# Patient Record
Sex: MALE | Race: WHITE | NOT HISPANIC OR LATINO
[De-identification: names, ages, dates, MRNs, and addresses within clinical notes are randomized per-mention and may not be internally consistent; named-entity substitution may affect disease eponyms.]

---

## 2018-11-15 PROBLEM — Z00.00 ENCOUNTER FOR PREVENTIVE HEALTH EXAMINATION: Status: ACTIVE | Noted: 2018-11-15

## 2018-12-23 ENCOUNTER — RECORD ABSTRACTING (OUTPATIENT)
Age: 63
End: 2018-12-23

## 2018-12-23 DIAGNOSIS — M96.1 POSTLAMINECTOMY SYNDROME, NOT ELSEWHERE CLASSIFIED: ICD-10-CM

## 2018-12-23 DIAGNOSIS — M21.969 UNSPECIFIED ACQUIRED DEFORMITY OF UNSPECIFIED LOWER LEG: ICD-10-CM

## 2018-12-23 DIAGNOSIS — Z87.2 PERSONAL HISTORY OF DISEASES OF THE SKIN AND SUBCUTANEOUS TISSUE: ICD-10-CM

## 2018-12-23 DIAGNOSIS — Z82.0 FAMILY HISTORY OF EPILEPSY AND OTHER DISEASES OF THE NERVOUS SYSTEM: ICD-10-CM

## 2018-12-23 DIAGNOSIS — M50.90 CERVICAL DISC DISORDER, UNSPECIFIED, UNSPECIFIED CERVICAL REGION: ICD-10-CM

## 2018-12-23 DIAGNOSIS — Z87.19 PERSONAL HISTORY OF OTHER DISEASES OF THE DIGESTIVE SYSTEM: ICD-10-CM

## 2018-12-23 DIAGNOSIS — Z86.19 PERSONAL HISTORY OF OTHER INFECTIOUS AND PARASITIC DISEASES: ICD-10-CM

## 2018-12-23 DIAGNOSIS — R07.89 OTHER CHEST PAIN: ICD-10-CM

## 2018-12-23 DIAGNOSIS — R25.2 CRAMP AND SPASM: ICD-10-CM

## 2018-12-23 DIAGNOSIS — Z87.39 PERSONAL HISTORY OF OTHER DISEASES OF THE MUSCULOSKELETAL SYSTEM AND CONNECTIVE TISSUE: ICD-10-CM

## 2018-12-23 DIAGNOSIS — Z86.69 PERSONAL HISTORY OF OTHER DISEASES OF THE NERVOUS SYSTEM AND SENSE ORGANS: ICD-10-CM

## 2019-01-02 ENCOUNTER — RECORD ABSTRACTING (OUTPATIENT)
Age: 64
End: 2019-01-02

## 2019-01-02 DIAGNOSIS — Z82.49 FAMILY HISTORY OF ISCHEMIC HEART DISEASE AND OTHER DISEASES OF THE CIRCULATORY SYSTEM: ICD-10-CM

## 2019-01-03 ENCOUNTER — RX RENEWAL (OUTPATIENT)
Age: 64
End: 2019-01-03

## 2019-02-07 ENCOUNTER — RECORD ABSTRACTING (OUTPATIENT)
Age: 64
End: 2019-02-07

## 2019-02-08 ENCOUNTER — LABORATORY RESULT (OUTPATIENT)
Age: 64
End: 2019-02-08

## 2019-02-08 ENCOUNTER — NON-APPOINTMENT (OUTPATIENT)
Age: 64
End: 2019-02-08

## 2019-02-08 ENCOUNTER — APPOINTMENT (OUTPATIENT)
Dept: CARDIOLOGY | Facility: CLINIC | Age: 64
End: 2019-02-08
Payer: COMMERCIAL

## 2019-02-08 VITALS
BODY MASS INDEX: 30.35 KG/M2 | HEART RATE: 62 BPM | WEIGHT: 212 LBS | SYSTOLIC BLOOD PRESSURE: 140 MMHG | HEIGHT: 70 IN | DIASTOLIC BLOOD PRESSURE: 88 MMHG

## 2019-02-08 DIAGNOSIS — Z78.9 OTHER SPECIFIED HEALTH STATUS: ICD-10-CM

## 2019-02-08 PROCEDURE — 36415 COLL VENOUS BLD VENIPUNCTURE: CPT

## 2019-02-08 PROCEDURE — 99214 OFFICE O/P EST MOD 30 MIN: CPT

## 2019-02-08 PROCEDURE — 93000 ELECTROCARDIOGRAM COMPLETE: CPT

## 2019-02-08 NOTE — ASSESSMENT
[FreeTextEntry1] : EKG 2/9/19. Sinus rhythm. Minor interventricular conduction delay. No acute changes.

## 2019-02-08 NOTE — REASON FOR VISIT
[FreeTextEntry1] : Tyler Quintana presents for cardiovascular followup.\par \par His problem list includes hypertension, dyslipidemia, cardiac arrhythmias including paroxysmal atrial tachycardia and ventricular ectopy, minor valve abnormalities, bronchospasm.\par \par He has additional medical problems as noted.\par \par His primary care physician is Doctor Efren Lockwood.

## 2019-02-08 NOTE — HISTORY OF PRESENT ILLNESS
[FreeTextEntry1] : This 63-year-old male patient presents for cardiovascular evaluation. His problem list is as noted.\par \par Since his last examination he reports no major events or hospitalizations. He has been active and exercising. He has just joined a new law firm. He is traveling frequently. He has noted some episodic edema.\par \par No symptoms of chest discomfort, shortness of breath. He notes occasional, rare palpitation perhaps one or 2 days a month, short-lived. 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

## 2019-02-08 NOTE — PHYSICAL EXAM

## 2019-02-08 NOTE — DISCUSSION/SUMMARY
[FreeTextEntry1] : Cardiac arrhythmias including paroxysmal supraventricular tachycardia and ventricular ectopy.\par Clinically stable with rare, short-lived arrhythmia.\par Continued twice daily antiarrhythmic treatment including Rythmol SI and diltiazem used on a twice-daily basis.\par I will plan for a followup treadmill stress test to assess exercise-induced arrhythmias at his next visit. I also order an echocardiogram to assess his valve status.\par \par Hypertension.\par Generally good control. Today's elevated level is an anomaly secondary to travel. Continue current routine.\par He may take a second dose of chlorthalidone for increasing peripheral edema.\par \par Dyslipidemia.\par No recent laboratories. I taken the liberty of drawing blood tests today.\par \par Valve/echo findings\par Reassess valve abnormalities.\par \par Additional medical problems as noted particularly musculoskeletal.\par \par

## 2019-02-12 ENCOUNTER — RESULT REVIEW (OUTPATIENT)
Age: 64
End: 2019-02-12

## 2019-02-12 LAB
ALBUMIN SERPL ELPH-MCNC: 4.8 G/DL
ALP BLD-CCNC: 41 U/L
ALT SERPL-CCNC: 18 U/L
ANION GAP SERPL CALC-SCNC: 14 MMOL/L
AST SERPL-CCNC: 21 U/L
BASOPHILS # BLD AUTO: 0.08 K/UL
BASOPHILS NFR BLD AUTO: 0.9 %
BILIRUB SERPL-MCNC: 0.7 MG/DL
BUN SERPL-MCNC: 29 MG/DL
CALCIUM SERPL-MCNC: 9.7 MG/DL
CHLORIDE SERPL-SCNC: 98 MMOL/L
CHOLEST SERPL-MCNC: 184 MG/DL
CHOLEST/HDLC SERPL: 2.2 RATIO
CK SERPL-CCNC: 207 U/L
CO2 SERPL-SCNC: 22 MMOL/L
CREAT SERPL-MCNC: 1.51 MG/DL
EOSINOPHIL # BLD AUTO: 0.59 K/UL
EOSINOPHIL NFR BLD AUTO: 6.9 %
GLUCOSE SERPL-MCNC: 92 MG/DL
HCT VFR BLD CALC: 40.9 %
HDLC SERPL-MCNC: 82 MG/DL
HGB BLD-MCNC: 13.2 G/DL
IMM GRANULOCYTES NFR BLD AUTO: 0.2 %
LDLC SERPL CALC-MCNC: 89 MG/DL
LYMPHOCYTES # BLD AUTO: 4.24 K/UL
LYMPHOCYTES NFR BLD AUTO: 49.7 %
MAN DIFF?: NORMAL
MCHC RBC-ENTMCNC: 30.6 PG
MCHC RBC-ENTMCNC: 32.3 GM/DL
MCV RBC AUTO: 94.7 FL
MONOCYTES # BLD AUTO: 0.6 K/UL
MONOCYTES NFR BLD AUTO: 7 %
NEUTROPHILS # BLD AUTO: 3 K/UL
NEUTROPHILS NFR BLD AUTO: 35.3 %
PLATELET # BLD AUTO: 233 K/UL
POTASSIUM SERPL-SCNC: 4.5 MMOL/L
PROT SERPL-MCNC: 6.7 G/DL
RBC # BLD: 4.32 M/UL
RBC # FLD: 13.4 %
SODIUM SERPL-SCNC: 134 MMOL/L
TRIGL SERPL-MCNC: 66 MG/DL
WBC # FLD AUTO: 8.53 K/UL

## 2019-03-15 ENCOUNTER — RX RENEWAL (OUTPATIENT)
Age: 64
End: 2019-03-15

## 2019-04-04 ENCOUNTER — RX RENEWAL (OUTPATIENT)
Age: 64
End: 2019-04-04

## 2019-04-26 ENCOUNTER — RX RENEWAL (OUTPATIENT)
Age: 64
End: 2019-04-26

## 2019-05-09 ENCOUNTER — RX RENEWAL (OUTPATIENT)
Age: 64
End: 2019-05-09

## 2019-06-18 ENCOUNTER — RX RENEWAL (OUTPATIENT)
Age: 64
End: 2019-06-18

## 2019-08-12 ENCOUNTER — RX RENEWAL (OUTPATIENT)
Age: 64
End: 2019-08-12

## 2019-09-25 ENCOUNTER — RX RENEWAL (OUTPATIENT)
Age: 64
End: 2019-09-25

## 2019-10-21 ENCOUNTER — MED ADMIN CHARGE (OUTPATIENT)
Age: 64
End: 2019-10-21

## 2019-10-21 ENCOUNTER — NON-APPOINTMENT (OUTPATIENT)
Age: 64
End: 2019-10-21

## 2019-10-21 ENCOUNTER — APPOINTMENT (OUTPATIENT)
Dept: CARDIOLOGY | Facility: CLINIC | Age: 64
End: 2019-10-21
Payer: COMMERCIAL

## 2019-10-21 VITALS
BODY MASS INDEX: 29.92 KG/M2 | HEART RATE: 55 BPM | HEIGHT: 70 IN | DIASTOLIC BLOOD PRESSURE: 84 MMHG | SYSTOLIC BLOOD PRESSURE: 130 MMHG | WEIGHT: 209 LBS

## 2019-10-21 PROCEDURE — 99214 OFFICE O/P EST MOD 30 MIN: CPT

## 2019-10-21 PROCEDURE — G0008: CPT

## 2019-10-21 PROCEDURE — 93000 ELECTROCARDIOGRAM COMPLETE: CPT

## 2019-10-21 PROCEDURE — 90674 CCIIV4 VAC NO PRSV 0.5 ML IM: CPT

## 2019-10-21 RX ORDER — RANITIDINE HYDROCHLORIDE 150 MG/1
150 TABLET, FILM COATED ORAL
Refills: 0 | Status: DISCONTINUED | COMMUNITY
End: 2019-10-21

## 2019-10-21 NOTE — ASSESSMENT
[FreeTextEntry1] : EKG 10/21/2019.  Sinus bradycardia rate minor interventricular conduction delay.  No significant change.\par EKG 2/9/19. Sinus rhythm. Minor interventricular conduction delay. No acute changes.

## 2019-10-21 NOTE — REASON FOR VISIT
[FreeTextEntry1] : Mr. RONEL OGLESBY has the following problem list:\par \par Hypertension\par Dyslipidemia\par Cardiac arrhythmias/paroxysmal atrial tachycardia/ventricular ectopy\par Minor echo valve/abnormalities\par Bronchospasm\par \par He has additional medical problems as noted.\par \par His primary care physician is Doctor Efren Lockwood.

## 2019-10-21 NOTE — ADDENDUM
MEDICATIONS:  · See Medication List (bring to your doctor appointments)    VACCINES:  Most Recent Immunizations   Administered Date(s) Administered   • Influenza, injectable, quadrivalent, preservative-free 10/02/2017   • Influenza, seasonal, injectable, trivalent 10/06/2016   • Pneumococcal polysaccharide, adult, 23 valent 03/10/2015   • Zoster Shingles 10/19/2012       ACTIVITY:  · Weigh yourself daily (first thing in the morning, with same amount of clothes on) unless told otherwise by your doctor.  · Continue activity as you were in the hospital, slowly increase to what you were doing previously.  · Up as desired / no restrictions.    SMOKING:  · Avoid all tobacco products and secondhand smoke.  · Smoking Cessation Counseling offered.  · [place] Toll Free Quit Line: [phone]    DIET:  · Limit salt and salty foods unless told otherwise by your doctor.  · No Restrictions    · Trouble breathing or chest pain - CALL 911    CONTACT YOUR DOCTOR IF:  · You have symptoms that are not \"normal\" for you.  · You have new or worse symptoms or pain, not relieved by medicine or rest.  · Temperature greater than 101 degrees F, chills or flu like symptoms.  · You gain more than 3 pounds in 2 days.  · Increased swelling, redness or drainage.    REFERRALS (Type/Agency/Phone):  · [employer]management: [phone]     [FreeTextEntry1] : This report was generated using voice recognition software. Please excuse obvious typographical errors and contact this office for any questions. Any preliminary copy of this note given to the patient at the time of this visit has not been proofread or edited. This note is part of a shared electronic record used by our providers and may contain information generated by others in addition to those entries made during today's visit.\par

## 2019-10-21 NOTE — HISTORY OF PRESENT ILLNESS
[FreeTextEntry1] : This 64 year-old male patient presents for cardiovascular evaluation.\par \par His problem list is as noted above.\par \par Since last examination he has had a significant medical event.  Patient underwent successful cervical fusion surgery.  He has made an acceptable recovery.  He is now active again.  There were no cardiac complications.\par \par There have been no acute symptoms of shortness of breath, chest discomfort, palpitation, lightheadedness.  Overall he is feeling well.\par \par He anticipates a examination with his primary care physician and I advised him to have comprehensive blood work and to kindly forward a copy.

## 2019-10-21 NOTE — DISCUSSION/SUMMARY
[FreeTextEntry1] : Brief recommendations and follow-up: (see above for details)\par \par His overall cardiovascular status is stable.\par He is recovering well after recent cervical disc surgery.\par No cardiac complications.\par He was previously scheduled for a treadmill stress test but we will defer at this visit until his next visit in 6 months.\par He will have medical follow-up and laboratories and forward a copy for my review.\par If necessary, reevaluation with the nephrologist.\par Next office visit 6 months

## 2019-11-06 ENCOUNTER — RX RENEWAL (OUTPATIENT)
Age: 64
End: 2019-11-06

## 2019-11-18 ENCOUNTER — RX RENEWAL (OUTPATIENT)
Age: 64
End: 2019-11-18

## 2019-12-11 ENCOUNTER — RX RENEWAL (OUTPATIENT)
Age: 64
End: 2019-12-11

## 2020-02-18 ENCOUNTER — RX CHANGE (OUTPATIENT)
Age: 65
End: 2020-02-18

## 2020-02-18 RX ORDER — DILTIAZEM HYDROCHLORIDE 120 MG/1
120 CAPSULE, EXTENDED RELEASE ORAL
Qty: 120 | Refills: 4 | Status: DISCONTINUED | COMMUNITY
Start: 2019-04-04 | End: 2020-02-18

## 2020-06-29 ENCOUNTER — RX RENEWAL (OUTPATIENT)
Age: 65
End: 2020-06-29

## 2020-07-14 ENCOUNTER — APPOINTMENT (OUTPATIENT)
Dept: CARDIOLOGY | Facility: CLINIC | Age: 65
End: 2020-07-14
Payer: COMMERCIAL

## 2020-07-14 VITALS
HEIGHT: 70 IN | HEART RATE: 61 BPM | SYSTOLIC BLOOD PRESSURE: 130 MMHG | BODY MASS INDEX: 30.35 KG/M2 | DIASTOLIC BLOOD PRESSURE: 80 MMHG | WEIGHT: 212 LBS

## 2020-07-14 PROCEDURE — 99214 OFFICE O/P EST MOD 30 MIN: CPT

## 2020-07-14 PROCEDURE — 93000 ELECTROCARDIOGRAM COMPLETE: CPT

## 2020-07-14 RX ORDER — OMEPRAZOLE MAGNESIUM 20 MG/1
20 TABLET, DELAYED RELEASE ORAL DAILY
Refills: 0 | Status: ACTIVE | COMMUNITY

## 2020-07-14 RX ORDER — FAMOTIDINE 10 MG/1
TABLET, FILM COATED ORAL
Refills: 0 | Status: DISCONTINUED | COMMUNITY
End: 2020-07-14

## 2020-07-14 NOTE — ASSESSMENT
[FreeTextEntry1] : EKG 7/14/2020.  Sinus rhythm, within normal limits.\par EKG 10/21/2019.  Sinus bradycardia rate minor interventricular conduction delay.  No significant change.\par EKG 2/9/19. Sinus rhythm. Minor interventricular conduction delay. No acute changes.

## 2020-07-14 NOTE — DISCUSSION/SUMMARY
[FreeTextEntry1] : Brief recommendations and follow-up: (see above for details)\par \par His overall cardiovascular status is stable.\par We decided to defer his exercise test to the next visit when hopefully he will not be required to wear a mask.\par Laboratories will be drawn today to recheck renal function.\par He will forward copies of his most recent laboratories.\par Next office visit 6 months with treadmill stress test.

## 2020-07-14 NOTE — HISTORY OF PRESENT ILLNESS
[FreeTextEntry1] : This 64 year-old male patient presents for cardiovascular evaluation.\par \par His problem list is as noted above.\par \par Since his last examination more than 6 months ago patient reports no major events or hospitalizations.  He is active and exercising.  There are no symptoms of shortness of breath, chest discomfort, palpitation, lightheadedness, fainting.  He was scheduled for early surveillance exercise test today however in view of the COVID-19 pandemic and use of the mass, we decided to defer this to a later visit.\par \par He has seen his primary care physician and had laboratories.  He is going to make an effort to obtain for my review.\par \par He did have a "borderline" blood pressure response and his olmesartan was increased from 20 to 40 mg daily.  He is never had follow-up laboratories and we will follow this today.\par

## 2020-07-15 LAB
ALBUMIN SERPL ELPH-MCNC: 4.6 G/DL
ALP BLD-CCNC: 34 U/L
ALT SERPL-CCNC: 22 U/L
ANION GAP SERPL CALC-SCNC: 14 MMOL/L
AST SERPL-CCNC: 28 U/L
BILIRUB SERPL-MCNC: 0.6 MG/DL
BUN SERPL-MCNC: 31 MG/DL
CALCIUM SERPL-MCNC: 9.1 MG/DL
CHLORIDE SERPL-SCNC: 101 MMOL/L
CO2 SERPL-SCNC: 23 MMOL/L
CREAT SERPL-MCNC: 1.49 MG/DL
GLUCOSE SERPL-MCNC: 98 MG/DL
POTASSIUM SERPL-SCNC: 4.2 MMOL/L
PROT SERPL-MCNC: 6 G/DL
SODIUM SERPL-SCNC: 138 MMOL/L

## 2020-10-05 ENCOUNTER — RX RENEWAL (OUTPATIENT)
Age: 65
End: 2020-10-05

## 2020-11-13 ENCOUNTER — APPOINTMENT (OUTPATIENT)
Dept: CARDIOLOGY | Facility: CLINIC | Age: 65
End: 2020-11-13
Payer: COMMERCIAL

## 2020-11-13 ENCOUNTER — NON-APPOINTMENT (OUTPATIENT)
Age: 65
End: 2020-11-13

## 2020-11-13 VITALS
HEART RATE: 61 BPM | DIASTOLIC BLOOD PRESSURE: 84 MMHG | SYSTOLIC BLOOD PRESSURE: 124 MMHG | WEIGHT: 212.13 LBS | BODY MASS INDEX: 30.37 KG/M2 | HEIGHT: 70 IN

## 2020-11-13 PROCEDURE — 99213 OFFICE O/P EST LOW 20 MIN: CPT

## 2020-11-13 PROCEDURE — 0296T: CPT

## 2020-11-13 PROCEDURE — 99072 ADDL SUPL MATRL&STAF TM PHE: CPT

## 2020-11-13 PROCEDURE — 93000 ELECTROCARDIOGRAM COMPLETE: CPT | Mod: 59

## 2020-11-13 NOTE — REVIEW OF SYSTEMS
[Headache] : no headache [Feeling Fatigued] : not feeling fatigued [Shortness Of Breath] : no shortness of breath [Dyspnea on exertion] : not dyspnea during exertion [Chest Pain] : no chest pain [Lower Ext Edema] : no extremity edema [Palpitations] : palpitations [Skin: A Rash] : no rash: [Dizziness] : no dizziness [Confusion] : no confusion was observed [FreeTextEntry2] : back pain

## 2020-11-13 NOTE — REASON FOR VISIT
[Follow-Up - Clinic] : a clinic follow-up of [Palpitations] : palpitations [FreeTextEntry1] : Mr. Quintana presents for urgent visit. He called the office with report of frequent palpitations for the past few weeks. He reports recently completing two rounds of Medrol taper and receiving steroid epidural injections. He is anticipating back surgery on 12/4/2020. \par \par He denies chest pain, SOB, dizziness or syncope.\par

## 2020-11-13 NOTE — HISTORY OF PRESENT ILLNESS
[FreeTextEntry1] : 65 year old male with hypertension, hyperlipidemia, paroxysmal SVT, cervical disc disease, chronic renal insufficiency.

## 2020-11-13 NOTE — PHYSICAL EXAM
[Normal Appearance] : normal appearance [Well Groomed] : well groomed [General Appearance - In No Acute Distress] : no acute distress [Respiration, Rhythm And Depth] : normal respiratory rhythm and effort [Auscultation Breath Sounds / Voice Sounds] : lungs were clear to auscultation bilaterally [Heart Sounds] : normal S1 and S2 [Murmurs] : no murmurs present [Edema] : no peripheral edema present [Abnormal Walk] : normal gait [] : no rash [Oriented To Time, Place, And Person] : oriented to person, place, and time [Impaired Insight] : insight and judgment were intact

## 2020-11-19 ENCOUNTER — NON-APPOINTMENT (OUTPATIENT)
Age: 65
End: 2020-11-19

## 2020-11-25 ENCOUNTER — NON-APPOINTMENT (OUTPATIENT)
Age: 65
End: 2020-11-25

## 2020-11-30 ENCOUNTER — APPOINTMENT (OUTPATIENT)
Dept: CARDIOLOGY | Facility: CLINIC | Age: 65
End: 2020-11-30
Payer: MEDICARE

## 2020-11-30 VITALS
OXYGEN SATURATION: 97 % | DIASTOLIC BLOOD PRESSURE: 78 MMHG | HEART RATE: 67 BPM | WEIGHT: 213 LBS | HEIGHT: 71 IN | BODY MASS INDEX: 29.82 KG/M2 | SYSTOLIC BLOOD PRESSURE: 130 MMHG

## 2020-11-30 DIAGNOSIS — Z01.810 ENCOUNTER FOR PREPROCEDURAL CARDIOVASCULAR EXAMINATION: ICD-10-CM

## 2020-11-30 DIAGNOSIS — Z86.79 PERSONAL HISTORY OF OTHER DISEASES OF THE CIRCULATORY SYSTEM: ICD-10-CM

## 2020-11-30 PROCEDURE — 99215 OFFICE O/P EST HI 40 MIN: CPT

## 2020-11-30 PROCEDURE — 93000 ELECTROCARDIOGRAM COMPLETE: CPT | Mod: NC

## 2020-11-30 RX ORDER — METHYLPREDNISOLONE 4 MG/1
4 TABLET ORAL
Qty: 21 | Refills: 0 | Status: DISCONTINUED | COMMUNITY
Start: 2020-08-31

## 2020-11-30 NOTE — ASSESSMENT
[FreeTextEntry1] : EKG 11/30/2020.  Sinus rhythm.  T wave inversion V1 through V3, within normal limits without significant change.\par EKG 7/14/2020.  Sinus rhythm, within normal limits.\par EKG 10/21/2019.  Sinus bradycardia rate minor interventricular conduction delay.  No significant change.\par EKG 2/9/19. Sinus rhythm. Minor interventricular conduction delay. No acute changes.

## 2020-11-30 NOTE — HISTORY OF PRESENT ILLNESS
[FreeTextEntry1] : This 65 year-old male patient presents for cardiovascular evaluation.\par \par His problem list is as noted above.\par \par Patient is seen on an expedited basis.  The patient is anticipating repeat lumbar spine surgery.  He has had many months of continuing symptoms despite epidural injection, 2 courses of steroids.  Preoperative consultation is requested.\par \par Of note is that the patient was seen in our office for a sensation of palpitation and documented cardiac arrhythmia.  He has had a longstanding history of both ventricular ectopy and atrial arrhythmias that have been under good control.  He has noted however that on 2 separate occasions he has noted an exacerbation of his arrhythmia when taking steroids.  There was no apparent stimulants/epinephrine.  2-week event monitoring demonstrated periods of atrial tachycardia.  This was performed from 11/13/2020 and ending 11/18/2020.  The longest atrial run was 17 beats with an average rate of 94 bpm.  His symptoms are now resolved.\par \par There are no symptoms of shortness of breath, chest discomfort, lightheadedness, fainting.\par

## 2020-11-30 NOTE — DISCUSSION/SUMMARY
[FreeTextEntry1] : Brief recommendations and follow-up: (see above for details)\par \par The patient is anticipating lumbar spine surgery and may proceed as noted with details above.\par His atrial arrhythmias appear to be under good control.  Continue current routine.\par Continue risk factor reduction as noted.\par He will move his appointment back into March for his routine visit.

## 2020-12-16 ENCOUNTER — RX RENEWAL (OUTPATIENT)
Age: 65
End: 2020-12-16

## 2020-12-31 ENCOUNTER — NON-APPOINTMENT (OUTPATIENT)
Age: 65
End: 2020-12-31

## 2021-01-04 ENCOUNTER — APPOINTMENT (OUTPATIENT)
Dept: HEART AND VASCULAR | Facility: CLINIC | Age: 66
End: 2021-01-04
Payer: MEDICARE

## 2021-01-04 VITALS
HEART RATE: 61 BPM | SYSTOLIC BLOOD PRESSURE: 140 MMHG | HEIGHT: 71 IN | BODY MASS INDEX: 30.52 KG/M2 | DIASTOLIC BLOOD PRESSURE: 82 MMHG | WEIGHT: 218 LBS

## 2021-01-04 PROCEDURE — 99205 OFFICE O/P NEW HI 60 MIN: CPT

## 2021-01-04 PROCEDURE — 93000 ELECTROCARDIOGRAM COMPLETE: CPT

## 2021-01-05 NOTE — HISTORY OF PRESENT ILLNESS
[FreeTextEntry1] :  65 year old male with HTN, HLD, PVCs and paroxysmal atrial tachycardia, who presents for initial evaluation.\par \par He states he has had episodes of atrial tachycardia for a few years and has been on propafenone.  These episodes are getting worse and he wore a recent monitor with frequent episodes of paroxysmal atrial tachycardia.  He was also found to have some PVCs that he is not very symptomatic from.  No chest pain, syncope, near syncope, SOB.  No alleviating or aggravating factors for palpitations.  Event monitor with frequent bursts of atrial tachycardia - on propafenone and diltiazem.

## 2021-01-05 NOTE — DISCUSSION/SUMMARY
[FreeTextEntry1] :  65 year old male with HTN, HLD, PVCs and paroxysmal atrial tachycardia, who presents for initial evaluation.  We discussed the natural conduction system of the heart and what atrial tachycardia is.  He is on propafenone and diltiazem with frequent symptomatic breakthrough episodes.  We discussed options of observation, increasing medications or an EP study/ablation.  He would like to proceed with an EP study / ablation.  We discussed the procedure in detail including risks, benefits and alternatives.  He was given pre procedure instructions.  He knows to call with any questions or concerns.

## 2021-01-05 NOTE — PHYSICAL EXAM
[General Appearance - Well Developed] : well developed [Normal Appearance] : normal appearance [Well Groomed] : well groomed [General Appearance - Well Nourished] : well nourished [No Deformities] : no deformities [General Appearance - In No Acute Distress] : no acute distress [Normal Conjunctiva] : the conjunctiva exhibited no abnormalities [Normal Oral Mucosa] : normal oral mucosa [Normal Jugular Venous V Waves Present] : normal jugular venous V waves present [Heart Rate And Rhythm] : heart rate and rhythm were normal [Heart Sounds] : normal S1 and S2 [Edema] : no peripheral edema present [] : no respiratory distress [Respiration, Rhythm And Depth] : normal respiratory rhythm and effort [Exaggerated Use Of Accessory Muscles For Inspiration] : no accessory muscle use [Auscultation Breath Sounds / Voice Sounds] : lungs were clear to auscultation bilaterally [Abdomen Soft] : soft [Abnormal Walk] : normal gait [Cyanosis, Localized] : no localized cyanosis [Skin Color & Pigmentation] : normal skin color and pigmentation [Oriented To Time, Place, And Person] : oriented to person, place, and time [Impaired Insight] : insight and judgment were intact [Affect] : the affect was normal [Mood] : the mood was normal [No Anxiety] : not feeling anxious

## 2021-01-08 ENCOUNTER — INPATIENT (INPATIENT)
Facility: HOSPITAL | Age: 66
LOS: 0 days | Discharge: HOME | DRG: 274 | End: 2021-01-09
Attending: INTERNAL MEDICINE | Admitting: INTERNAL MEDICINE
Payer: MEDICARE

## 2021-01-08 ENCOUNTER — TRANSCRIPTION ENCOUNTER (OUTPATIENT)
Age: 66
End: 2021-01-08

## 2021-01-08 VITALS
WEIGHT: 211.64 LBS | DIASTOLIC BLOOD PRESSURE: 70 MMHG | SYSTOLIC BLOOD PRESSURE: 135 MMHG | HEIGHT: 70 IN | HEART RATE: 91 BPM | RESPIRATION RATE: 18 BRPM

## 2021-01-08 DIAGNOSIS — I10 ESSENTIAL (PRIMARY) HYPERTENSION: ICD-10-CM

## 2021-01-08 DIAGNOSIS — E78.5 HYPERLIPIDEMIA, UNSPECIFIED: ICD-10-CM

## 2021-01-08 DIAGNOSIS — Z86.79 PERSONAL HISTORY OF OTHER DISEASES OF THE CIRCULATORY SYSTEM: ICD-10-CM

## 2021-01-08 LAB
ALBUMIN SERPL ELPH-MCNC: 4.5 G/DL — SIGNIFICANT CHANGE UP (ref 3.3–5)
ALP SERPL-CCNC: 40 U/L — SIGNIFICANT CHANGE UP (ref 40–120)
ALT FLD-CCNC: 20 U/L — SIGNIFICANT CHANGE UP (ref 10–45)
ANION GAP SERPL CALC-SCNC: 14 MMOL/L — SIGNIFICANT CHANGE UP (ref 5–17)
APTT BLD: 26.2 SEC — LOW (ref 27.5–35.5)
AST SERPL-CCNC: 23 U/L — SIGNIFICANT CHANGE UP (ref 10–40)
BILIRUB SERPL-MCNC: 0.4 MG/DL — SIGNIFICANT CHANGE UP (ref 0.2–1.2)
BLD GP AB SCN SERPL QL: NEGATIVE — SIGNIFICANT CHANGE UP
BUN SERPL-MCNC: 35 MG/DL — HIGH (ref 7–23)
CALCIUM SERPL-MCNC: 9.6 MG/DL — SIGNIFICANT CHANGE UP (ref 8.4–10.5)
CHLORIDE SERPL-SCNC: 95 MMOL/L — LOW (ref 96–108)
CO2 SERPL-SCNC: 25 MMOL/L — SIGNIFICANT CHANGE UP (ref 22–31)
CREAT SERPL-MCNC: 1.33 MG/DL — HIGH (ref 0.5–1.3)
GLUCOSE SERPL-MCNC: 106 MG/DL — HIGH (ref 70–99)
HCT VFR BLD CALC: 41.1 % — SIGNIFICANT CHANGE UP (ref 39–50)
HGB BLD-MCNC: 13.2 G/DL — SIGNIFICANT CHANGE UP (ref 13–17)
INR BLD: 0.89 — SIGNIFICANT CHANGE UP (ref 0.88–1.16)
MCHC RBC-ENTMCNC: 30.4 PG — SIGNIFICANT CHANGE UP (ref 27–34)
MCHC RBC-ENTMCNC: 32.1 GM/DL — SIGNIFICANT CHANGE UP (ref 32–36)
MCV RBC AUTO: 94.7 FL — SIGNIFICANT CHANGE UP (ref 80–100)
NRBC # BLD: 0 /100 WBCS — SIGNIFICANT CHANGE UP (ref 0–0)
PLATELET # BLD AUTO: 207 K/UL — SIGNIFICANT CHANGE UP (ref 150–400)
POTASSIUM SERPL-MCNC: 4.7 MMOL/L — SIGNIFICANT CHANGE UP (ref 3.5–5.3)
POTASSIUM SERPL-SCNC: 4.7 MMOL/L — SIGNIFICANT CHANGE UP (ref 3.5–5.3)
PROT SERPL-MCNC: 6.7 G/DL — SIGNIFICANT CHANGE UP (ref 6–8.3)
PROTHROM AB SERPL-ACNC: 10.7 SEC — SIGNIFICANT CHANGE UP (ref 10.6–13.6)
RBC # BLD: 4.34 M/UL — SIGNIFICANT CHANGE UP (ref 4.2–5.8)
RBC # FLD: 12.7 % — SIGNIFICANT CHANGE UP (ref 10.3–14.5)
RH IG SCN BLD-IMP: POSITIVE — SIGNIFICANT CHANGE UP
SODIUM SERPL-SCNC: 134 MMOL/L — LOW (ref 135–145)
WBC # BLD: 8.29 K/UL — SIGNIFICANT CHANGE UP (ref 3.8–10.5)
WBC # FLD AUTO: 8.29 K/UL — SIGNIFICANT CHANGE UP (ref 3.8–10.5)

## 2021-01-08 PROCEDURE — 93653 COMPRE EP EVAL TX SVT: CPT

## 2021-01-08 PROCEDURE — 93621 COMP EP EVL L PAC&REC C SINS: CPT | Mod: 26

## 2021-01-08 PROCEDURE — 93613 INTRACARDIAC EPHYS 3D MAPG: CPT

## 2021-01-08 PROCEDURE — 99223 1ST HOSP IP/OBS HIGH 75: CPT | Mod: 25

## 2021-01-08 PROCEDURE — 93662 INTRACARDIAC ECG (ICE): CPT | Mod: 26

## 2021-01-08 PROCEDURE — 93623 PRGRMD STIMJ&PACG IV RX NFS: CPT | Mod: 26

## 2021-01-08 PROCEDURE — 93655 ICAR CATH ABLTJ DSCRT ARRHYT: CPT

## 2021-01-08 PROCEDURE — 93462 L HRT CATH TRNSPTL PUNCTURE: CPT

## 2021-01-08 RX ORDER — OMEPRAZOLE 10 MG/1
1 CAPSULE, DELAYED RELEASE ORAL
Qty: 0 | Refills: 0 | DISCHARGE

## 2021-01-08 RX ORDER — ACETAMINOPHEN 500 MG
650 TABLET ORAL EVERY 6 HOURS
Refills: 0 | Status: DISCONTINUED | OUTPATIENT
Start: 2021-01-08 | End: 2021-01-09

## 2021-01-08 RX ORDER — PROPAFENONE HCL 150 MG
1 TABLET ORAL
Qty: 0 | Refills: 0 | DISCHARGE

## 2021-01-08 RX ORDER — DILTIAZEM HCL 120 MG
120 CAPSULE, EXT RELEASE 24 HR ORAL
Refills: 0 | Status: DISCONTINUED | OUTPATIENT
Start: 2021-01-08 | End: 2021-01-08

## 2021-01-08 RX ORDER — OLMESARTAN MEDOXOMIL 5 MG/1
1 TABLET, FILM COATED ORAL
Qty: 0 | Refills: 0 | DISCHARGE

## 2021-01-08 RX ORDER — LOSARTAN POTASSIUM 100 MG/1
100 TABLET, FILM COATED ORAL DAILY
Refills: 0 | Status: DISCONTINUED | OUTPATIENT
Start: 2021-01-09 | End: 2021-01-09

## 2021-01-08 RX ORDER — INFLUENZA VIRUS VACCINE 15; 15; 15; 15 UG/.5ML; UG/.5ML; UG/.5ML; UG/.5ML
0.5 SUSPENSION INTRAMUSCULAR ONCE
Refills: 0 | Status: DISCONTINUED | OUTPATIENT
Start: 2021-01-08 | End: 2021-01-08

## 2021-01-08 RX ORDER — BENZOCAINE AND MENTHOL 5; 1 G/100ML; G/100ML
1 LIQUID ORAL
Refills: 0 | Status: DISCONTINUED | OUTPATIENT
Start: 2021-01-08 | End: 2021-01-09

## 2021-01-08 RX ORDER — ATORVASTATIN CALCIUM 80 MG/1
1 TABLET, FILM COATED ORAL
Qty: 0 | Refills: 0 | DISCHARGE

## 2021-01-08 RX ORDER — ASPIRIN/CALCIUM CARB/MAGNESIUM 324 MG
1 TABLET ORAL
Qty: 0 | Refills: 0 | DISCHARGE

## 2021-01-08 RX ORDER — ASPIRIN/CALCIUM CARB/MAGNESIUM 324 MG
81 TABLET ORAL DAILY
Refills: 0 | Status: DISCONTINUED | OUTPATIENT
Start: 2021-01-08 | End: 2021-01-08

## 2021-01-08 RX ORDER — INFLUENZA VIRUS VACCINE 15; 15; 15; 15 UG/.5ML; UG/.5ML; UG/.5ML; UG/.5ML
0.7 SUSPENSION INTRAMUSCULAR ONCE
Refills: 0 | Status: COMPLETED | OUTPATIENT
Start: 2021-01-08 | End: 2021-01-08

## 2021-01-08 RX ORDER — DILTIAZEM HCL 120 MG
1 CAPSULE, EXT RELEASE 24 HR ORAL
Qty: 0 | Refills: 0 | DISCHARGE

## 2021-01-08 RX ORDER — PANTOPRAZOLE SODIUM 20 MG/1
40 TABLET, DELAYED RELEASE ORAL DAILY
Refills: 0 | Status: DISCONTINUED | OUTPATIENT
Start: 2021-01-09 | End: 2021-01-09

## 2021-01-08 RX ORDER — METOPROLOL TARTRATE 50 MG
100 TABLET ORAL DAILY
Refills: 0 | Status: DISCONTINUED | OUTPATIENT
Start: 2021-01-08 | End: 2021-01-09

## 2021-01-08 RX ORDER — APIXABAN 2.5 MG/1
5 TABLET, FILM COATED ORAL EVERY 12 HOURS
Refills: 0 | Status: DISCONTINUED | OUTPATIENT
Start: 2021-01-08 | End: 2021-01-09

## 2021-01-08 RX ORDER — CHLORTHALIDONE 50 MG
0.5 TABLET ORAL
Qty: 0 | Refills: 0 | DISCHARGE

## 2021-01-08 RX ORDER — ATORVASTATIN CALCIUM 80 MG/1
10 TABLET, FILM COATED ORAL DAILY
Refills: 0 | Status: DISCONTINUED | OUTPATIENT
Start: 2021-01-08 | End: 2021-01-09

## 2021-01-08 RX ADMIN — ATORVASTATIN CALCIUM 10 MILLIGRAM(S): 80 TABLET, FILM COATED ORAL at 19:12

## 2021-01-08 RX ADMIN — Medication 1 DROP(S): at 17:17

## 2021-01-08 RX ADMIN — Medication 650 MILLIGRAM(S): at 22:44

## 2021-01-08 RX ADMIN — APIXABAN 5 MILLIGRAM(S): 2.5 TABLET, FILM COATED ORAL at 19:12

## 2021-01-08 RX ADMIN — BENZOCAINE AND MENTHOL 1 LOZENGE: 5; 1 LIQUID ORAL at 17:17

## 2021-01-08 NOTE — H&P ADULT - HISTORY OF PRESENT ILLNESS
Mr. Quintana is a 65 year old male with HTN, HLD, PVCs and paroxysmal atrial tachycardia, who presents for an EPS +/- AT ablation.     The patient states that he has had episodes of atrial tachycardia for a few years and has been on propafenone. These episodes are getting worse and he wore a recent monitor with frequent episodes of paroxysmal atrial tachycardia. He is not on an oral A/C drug. He was also found to have some PVCs that he is not very symptomatic from. No chest pain, syncope, near syncope, SOB. No alleviating or aggravating factors for palpitations. Event monitor with frequent bursts of atrial tachycardia - on propafenone and diltiazem.     He presents today feeling well. Denies recent covid exposure, cough, sob, or fever.    Echo (10/2019) LVEF 70%, normal LA size, trace

## 2021-01-08 NOTE — PROGRESS NOTE ADULT - SUBJECTIVE AND OBJECTIVE BOX
Pre-op Diagnosis:  Atrial tachycardia     Post-op Diagnosis:  Non sustained atrial tachycardia    Procedure:  Pulmonary vein isolation  Cavotricuspid isthmus ablation    Electrophysiologist:  Quang Alves MD    Fellow:  MD Francisco Hoffman MD    Anesthesia:  General Anesthesia    Access:  Bilateral femoral vein access    Description:  EP study performed. Patient had non sustained atrial tachycardia with earliest activation in left atrium based on CS activation pattern.  Could not be mapped as it was non sustained. Decision made to perform pulmonary vein isolation.  Pulmonary vein isolation performed. Cavotricuspid isthmus ablation performed.      Complications:  None     EBL:  Less than 20 ml      Disposition:  stable      Plan:  Bed rest for 4 hours.  Overnight observation. Discharge tomorrow.  Stop propafenone and diltiazem.  Will start on Metoprolol succinate 100 mg daily.

## 2021-01-08 NOTE — H&P ADULT - PROBLEM SELECTOR PLAN 1
- Admit overnight for HD monitoring, AAD titration and possible blood thinner initiation.   - Bedrest for 4 hours post-procedure.  - Continue diltiazem for rate control/ectopy suppression.

## 2021-01-08 NOTE — DISCHARGE NOTE PROVIDER - NSDCFUADDINST_GEN_ALL_CORE_FT
You had an electrophysiology study on 1/8/21. Ablation was done (pulmonary vein isolation for AFIB and aflutter ablation).  As a result, you should be on a blood thinner for at least 1 month post ablation.  A prescription for Eliquis is to be sent to your local pharmacy.   - Stop Diltiazem and Propafenone. Stop Aspirin.    - Start Metoprolol  mg once daily.   - Please make appointment to follow up with DR. Alves in 1 month.    - Wound care instruction:  Avoid strenuous activities such as lifting, running, pushing or sex for 1 week in order to avoid bleeding complications in the groin.  If any questions about the groin, call us at [phone] -- [place] office.  Ok to shower tonight.  Avoid bathing for 1 week

## 2021-01-08 NOTE — DISCHARGE NOTE PROVIDER - NSDCMRMEDTOKEN_GEN_ALL_CORE_FT
atorvastatin 10 mg oral tablet: 1 tab(s) orally once a day  Benicar 40 mg oral tablet: 1 tab(s) orally once a day  chlorthalidone 25 mg oral tablet: 0.5 tab(s) orally once a day  PriLOSEC 20 mg oral delayed release capsule: 1 cap(s) orally once a day   apixaban 5 mg oral tablet: 1 tab(s) orally every 12 hours  atorvastatin 10 mg oral tablet: 1 tab(s) orally once a day  Benicar 40 mg oral tablet: 1 tab(s) orally once a day  chlorthalidone 25 mg oral tablet: 0.5 tab(s) orally once a day  colchicine 0.6 mg oral capsule: 1 cap(s) orally once a day   metoprolol succinate 100 mg oral tablet, extended release: 1 tab(s) orally once a day  PriLOSEC 20 mg oral delayed release capsule: 1 cap(s) orally once a day

## 2021-01-08 NOTE — DISCHARGE NOTE PROVIDER - NSDCCPTREATMENT_GEN_ALL_CORE_FT
PRINCIPAL PROCEDURE  Procedure: Cardiac ablation  Findings and Treatment: pulm vein isolation and CTI ablation

## 2021-01-08 NOTE — H&P ADULT - ASSESSMENT
Mr. Quintana is a 65 year old male with HTN, HLD, PVCs and paroxysmal atrial tachycardia, who presents for an EPS +/- AT ablation.

## 2021-01-08 NOTE — DISCHARGE NOTE PROVIDER - CARE PROVIDER_API CALL
Quang Alves)  Cardiac Electrophysiology  [address], [address] [place] [address]  Phone: [phone]  Fax: [phone]  Follow Up Time:

## 2021-01-08 NOTE — DISCHARGE NOTE PROVIDER - HOSPITAL COURSE
65 year old male with HTN, HLD, PVCs and paroxysmal atrial tachycardia, who presents for an EPS +/- AT ablation. Has normal LVEF on echo in 10/2019.  Underwent EPS that showed non-sustained AT.  Unable to map AT due to not able to have sustained AT during the case.  Decision was made to do PVI cryoablation and RFA of CTI.  Eliquis was started post procedure.  Diltiazem, Propafenone and ASA were stopped.     65 year old male with HTN, HLD, PVCs and paroxysmal atrial tachycardia, who presents for an EPS +/- AT ablation. Has normal LVEF on echo in 10/2019.  Underwent EPS that showed non-sustained AT.  Unable to map AT due to not able to have sustained AT during the case.  Decision was made to do PVI cryoablation and RFA of CTI.  Eliquis was started post procedure.  Diltiazem, Propafenone and ASA were stopped.  Pt has been seen and examined this am and is without complaints.    65 year old male with HTN, HLD, PVCs and paroxysmal atrial tachycardia, who presents for an EPS +/- AT ablation. Has normal LVEF on echo in 10/2019.  Underwent EPS that showed non-sustained AT.  Unable to map AT due to not able to have sustained AT during the case.  Decision was made to do PVI cryoablation and RFA of CTI.  Eliquis was started post procedure.  Diltiazem, Propafenone and ASA were stopped.  Pt has been seen and examined this am and is without complaints. Pt is out of bed and ambulating with no complaints. B/L groin access stable with no hematoma, no bleed, distal pulses to baseline. Lab values, telemetry, and vital signs reviewed and remained stable. Discharge medication regimen reviewed with patient and  ___________. Pt will continue with Eliquis 5mg BID, Toprol 100mg daily, Benicar 40mg daily, chlorthalidone 0.5mg daily, atorvastatin 10mg daily, protonix 40mg daily. All discharge instructions reviewed with patient and medications e-prescribed to pharmacy. Pt is cleared for discharge per  ________, and will follow up with Dr. Alves within 1 month of discharge.     65 year old male with HTN, HLD, PVCs and paroxysmal atrial tachycardia, who presents for an EPS +/- AT ablation. Has normal LVEF on echo in 10/2019.  Underwent EPS that showed non-sustained AT.  Unable to map AT due to not able to have sustained AT during the case.  Decision was made to do PVI cryoablation and RFA of CTI.  Eliquis was started post procedure.  Diltiazem, Propafenone and ASA were stopped.  Pt has been seen and examined this am and is without complaints. Pt is out of bed and ambulating with no complaints. B/L groin access stable with no hematoma, no bleed, distal pulses to baseline. Lab values, telemetry, and vital signs reviewed and remained stable. Discharge medication regimen reviewed with patient and Dr. Castillo. Pt will continue with Eliquis 5mg BID, Toprol 100mg daily, Benicar 40mg daily, chlorthalidone 12.5mg daily, atorvastatin 10mg daily, protonix 40mg daily. All discharge instructions reviewed with patient and medications e-prescribed to pharmacy. Pt is cleared for discharge per Dr. Castillo, and will follow up with Dr. Alves within 1 month of discharge.     65 year old male with HTN, HLD, PVCs and paroxysmal atrial tachycardia, who presents for an EPS +/- AT ablation. Has normal LVEF on echo in 10/2019.  Underwent EPS that showed non-sustained AT.  Unable to map AT due to not able to have sustained AT during the case.  Decision was made to do PVI cryoablation and RFA of CTI.  Eliquis was started post procedure.  Diltiazem, Propafenone and ASA were stopped.  Pt has been seen and examined this am and is without complaints. Pt is out of bed and ambulating with no complaints. B/L groin access stable with no hematoma, no bleed, distal pulses to baseline. Telemetry and vital signs reviewed and remained stable. CBC revealed decrease in Hgg to 10.7 from 13.2 on admission. Access sites are stable. Pt not complaining of new or worsening back pain. Repeat Hgb was 10.9. Discharge medication regimen reviewed with patient and Dr. Castillo. Pt will continue with Eliquis 5mg BID, Toprol 100mg daily, Benicar 40mg daily, chlorthalidone 12.5mg daily, atorvastatin 10mg daily, protonix 40mg daily. All discharge instructions reviewed with patient and medications e-prescribed to pharmacy. Pt is cleared for discharge per Dr. Castillo, and will follow up with Dr. Alves within 1 month of discharge.     65 year old male with HTN, HLD, PVCs and paroxysmal atrial tachycardia, who presents for an EPS +/- AT ablation. Has normal LVEF on echo in 10/2019.  Underwent EPS that showed non-sustained AT.  Unable to map AT due to not able to have sustained AT during the case.  Decision was made to do PVI cryoablation and RFA of CTI.  Eliquis was started post procedure.  Diltiazem, Propafenone and ASA were stopped.  Pt has been seen and examined this am and is without complaints. Pt is out of bed and ambulating with no complaints. B/L groin access stable with no hematoma, no bleed, distal pulses to baseline. Telemetry and vital signs reviewed and remained stable. CBC revealed decrease in Hgg to 10.7 from 13.2 on admission; repeat this am stable at 10.9. Access sites are clean, dry, and stable. Pt not complaining of new or worsening back pain. Repeat Hgb was 10.9. Discharge medication regimen reviewed with patient. Pt will continue with Eliquis 5mg BID, Toprol 100mg daily, Benicar 40mg daily, chlorthalidone 12.5mg daily, atorvastatin 10mg daily, protonix 40mg daily. All discharge instructions reviewed with patient and medications e-prescribed to pharmacy. Pt is cleared for discharge, and will follow up with Dr. Alves within 1 month of discharge.

## 2021-01-08 NOTE — H&P ADULT - NSICDXPASTMEDICALHX_GEN_ALL_CORE_FT
PAST MEDICAL HISTORY:  History of PAT (paroxysmal atrial tachycardia)     Hyperlipidemia     Hypertension     PVC (premature ventricular contraction)

## 2021-01-08 NOTE — DISCHARGE NOTE PROVIDER - NSDCCPCAREPLAN_GEN_ALL_CORE_FT
PRINCIPAL DISCHARGE DIAGNOSIS  Diagnosis: Atrial arrhythmia  Assessment and Plan of Treatment:        PRINCIPAL DISCHARGE DIAGNOSIS  Diagnosis: Atrial arrhythmia  Assessment and Plan of Treatment: You have had an arrhythmia (abnormal heart rhythm). You underwent an ablation on 1/8/2020 to help prevent your heart from going back into that rhythm. The procedure was performed via your right and left groin. Avoid strenuous activity or heavy lifting for the next five days. Do not take a bath or swim for the next five days; you may shower. For any bleeding or hematoma formation (hardened blood collection under the skin) at the access site of your right and/or left groin, please hold pressure and go to the emergency room. Please follow up with Dr. Alves in 1 month at Dr. Walls's office (NOT on the 2nd floor at [facility]. You will need to call to make an appointment.   Please STOP aspirin, propafenone, and diltiazem. Please START Eliquis 5mg twice daily, and metoprolol succinate (Toprol) 100mg daily. These medications have been sent to your pharmacy.         SECONDARY DISCHARGE DIAGNOSES  Diagnosis: Essential hypertension  Assessment and Plan of Treatment: Please continue with Benicar 40mg daily,  chlorthalidone 12.5mg daily, and metoprolol succinate 100mg daily.    Diagnosis: Hyperlipidemia  Assessment and Plan of Treatment: Please continue atorvastatin 10mg daily.

## 2021-01-08 NOTE — H&P ADULT - NSHPLABSRESULTS_GEN_ALL_CORE
13.2   8.29  )-----------( 207      ( 08 Jan 2021 08:23 )             41.1     01-08    134<L>  |  95<L>  |  35<H>  ----------------------------<  106<H>  4.7   |  25  |  1.33<H>    Ca    9.6      08 Jan 2021 08:23    TPro  6.7  /  Alb  4.5  /  TBili  0.4  /  DBili  x   /  AST  23  /  ALT  20  /  AlkPhos  40  01-08    PT/INR - ( 08 Jan 2021 08:23 )   PT: 10.7 sec;   INR: 0.89       PTT - ( 08 Jan 2021 08:23 )  PTT:26.2 sec

## 2021-01-08 NOTE — H&P ADULT - NSHPPHYSICALEXAM_GEN_ALL_CORE
Constitutional: No acute Distress  Psych: Normal affect, normal mood  Neuro: A/o x 3, No focal deficits  Neck: Supple, NO JVD  CVS: S1 S2, No M/R/G  Pulmonary: CTAB, Breathing unlabored, No Rhonchi/Rales/Wheezing  GI: Soft, Non -tender, +BS x 4 quads  Skin: No rash, warm and dry, no erythematous areas   MSK: 5/5 strength, normal range of motion, no swollen or erythematous joints.

## 2021-01-09 ENCOUNTER — TRANSCRIPTION ENCOUNTER (OUTPATIENT)
Age: 66
End: 2021-01-09

## 2021-01-09 VITALS — TEMPERATURE: 98 F

## 2021-01-09 LAB
ANION GAP SERPL CALC-SCNC: 12 MMOL/L — SIGNIFICANT CHANGE UP (ref 5–17)
BUN SERPL-MCNC: 26 MG/DL — HIGH (ref 7–23)
CALCIUM SERPL-MCNC: 8.3 MG/DL — LOW (ref 8.4–10.5)
CHLORIDE SERPL-SCNC: 100 MMOL/L — SIGNIFICANT CHANGE UP (ref 96–108)
CO2 SERPL-SCNC: 21 MMOL/L — LOW (ref 22–31)
CREAT SERPL-MCNC: 1.12 MG/DL — SIGNIFICANT CHANGE UP (ref 0.5–1.3)
GLUCOSE SERPL-MCNC: 114 MG/DL — HIGH (ref 70–99)
HCT VFR BLD CALC: 32.9 % — LOW (ref 39–50)
HCT VFR BLD CALC: 33.6 % — LOW (ref 39–50)
HCV AB S/CO SERPL IA: 0.05 S/CO — SIGNIFICANT CHANGE UP
HCV AB SERPL-IMP: SIGNIFICANT CHANGE UP
HGB BLD-MCNC: 10.7 G/DL — LOW (ref 13–17)
HGB BLD-MCNC: 10.9 G/DL — LOW (ref 13–17)
MAGNESIUM SERPL-MCNC: 1.8 MG/DL — SIGNIFICANT CHANGE UP (ref 1.6–2.6)
MCHC RBC-ENTMCNC: 30.9 PG — SIGNIFICANT CHANGE UP (ref 27–34)
MCHC RBC-ENTMCNC: 31.4 PG — SIGNIFICANT CHANGE UP (ref 27–34)
MCHC RBC-ENTMCNC: 32.4 GM/DL — SIGNIFICANT CHANGE UP (ref 32–36)
MCHC RBC-ENTMCNC: 32.5 GM/DL — SIGNIFICANT CHANGE UP (ref 32–36)
MCV RBC AUTO: 95.1 FL — SIGNIFICANT CHANGE UP (ref 80–100)
MCV RBC AUTO: 96.8 FL — SIGNIFICANT CHANGE UP (ref 80–100)
NRBC # BLD: 0 /100 WBCS — SIGNIFICANT CHANGE UP (ref 0–0)
NRBC # BLD: 0 /100 WBCS — SIGNIFICANT CHANGE UP (ref 0–0)
PLATELET # BLD AUTO: 157 K/UL — SIGNIFICANT CHANGE UP (ref 150–400)
PLATELET # BLD AUTO: 164 K/UL — SIGNIFICANT CHANGE UP (ref 150–400)
POTASSIUM SERPL-MCNC: 3.9 MMOL/L — SIGNIFICANT CHANGE UP (ref 3.5–5.3)
POTASSIUM SERPL-SCNC: 3.9 MMOL/L — SIGNIFICANT CHANGE UP (ref 3.5–5.3)
RBC # BLD: 3.46 M/UL — LOW (ref 4.2–5.8)
RBC # BLD: 3.47 M/UL — LOW (ref 4.2–5.8)
RBC # FLD: 13.2 % — SIGNIFICANT CHANGE UP (ref 10.3–14.5)
RBC # FLD: 13.2 % — SIGNIFICANT CHANGE UP (ref 10.3–14.5)
SODIUM SERPL-SCNC: 133 MMOL/L — LOW (ref 135–145)
WBC # BLD: 11.61 K/UL — HIGH (ref 3.8–10.5)
WBC # BLD: 11.78 K/UL — HIGH (ref 3.8–10.5)
WBC # FLD AUTO: 11.61 K/UL — HIGH (ref 3.8–10.5)
WBC # FLD AUTO: 11.78 K/UL — HIGH (ref 3.8–10.5)

## 2021-01-09 PROCEDURE — 99239 HOSP IP/OBS DSCHRG MGMT >30: CPT

## 2021-01-09 PROCEDURE — 99238 HOSP IP/OBS DSCHRG MGMT 30/<: CPT

## 2021-01-09 RX ORDER — POTASSIUM CHLORIDE 20 MEQ
10 PACKET (EA) ORAL ONCE
Refills: 0 | Status: COMPLETED | OUTPATIENT
Start: 2021-01-09 | End: 2021-01-09

## 2021-01-09 RX ORDER — MAGNESIUM OXIDE 400 MG ORAL TABLET 241.3 MG
800 TABLET ORAL ONCE
Refills: 0 | Status: COMPLETED | OUTPATIENT
Start: 2021-01-09 | End: 2021-01-09

## 2021-01-09 RX ORDER — COLCHICINE 0.6 MG
1 TABLET ORAL
Qty: 7 | Refills: 0
Start: 2021-01-09 | End: 2021-01-15

## 2021-01-09 RX ORDER — METOPROLOL TARTRATE 50 MG
1 TABLET ORAL
Qty: 30 | Refills: 1
Start: 2021-01-09 | End: 2021-03-09

## 2021-01-09 RX ORDER — APIXABAN 2.5 MG/1
1 TABLET, FILM COATED ORAL
Qty: 60 | Refills: 1
Start: 2021-01-09 | End: 2021-03-09

## 2021-01-09 RX ADMIN — LOSARTAN POTASSIUM 100 MILLIGRAM(S): 100 TABLET, FILM COATED ORAL at 07:03

## 2021-01-09 RX ADMIN — Medication 100 MILLIGRAM(S): at 05:49

## 2021-01-09 RX ADMIN — Medication 10 MILLIEQUIVALENT(S): at 11:15

## 2021-01-09 RX ADMIN — Medication 650 MILLIGRAM(S): at 07:04

## 2021-01-09 RX ADMIN — APIXABAN 5 MILLIGRAM(S): 2.5 TABLET, FILM COATED ORAL at 05:49

## 2021-01-09 RX ADMIN — MAGNESIUM OXIDE 400 MG ORAL TABLET 800 MILLIGRAM(S): 241.3 TABLET ORAL at 11:15

## 2021-01-09 RX ADMIN — BENZOCAINE AND MENTHOL 1 LOZENGE: 5; 1 LIQUID ORAL at 05:49

## 2021-01-09 NOTE — DISCHARGE NOTE NURSING/CASE MANAGEMENT/SOCIAL WORK - PATIENT PORTAL LINK FT
You can access the FollowMyHealth Patient Portal offered by [facility] by registering at the following website: [email or web address]. By joining [employer]’s FollowMyHealth portal, you will also be able to view your health information using other applications (apps) compatible with our system.

## 2021-01-15 DIAGNOSIS — K21.9 GASTRO-ESOPHAGEAL REFLUX DISEASE WITHOUT ESOPHAGITIS: ICD-10-CM

## 2021-01-15 DIAGNOSIS — I47.1 SUPRAVENTRICULAR TACHYCARDIA: ICD-10-CM

## 2021-01-15 DIAGNOSIS — I49.8 OTHER SPECIFIED CARDIAC ARRHYTHMIAS: ICD-10-CM

## 2021-01-15 DIAGNOSIS — I49.3 VENTRICULAR PREMATURE DEPOLARIZATION: ICD-10-CM

## 2021-01-15 DIAGNOSIS — I10 ESSENTIAL (PRIMARY) HYPERTENSION: ICD-10-CM

## 2021-01-15 DIAGNOSIS — Z86.19 PERSONAL HISTORY OF OTHER INFECTIOUS AND PARASITIC DISEASES: ICD-10-CM

## 2021-01-15 DIAGNOSIS — Z79.82 LONG TERM (CURRENT) USE OF ASPIRIN: ICD-10-CM

## 2021-01-15 DIAGNOSIS — N28.9 DISORDER OF KIDNEY AND URETER, UNSPECIFIED: ICD-10-CM

## 2021-01-15 DIAGNOSIS — E78.5 HYPERLIPIDEMIA, UNSPECIFIED: ICD-10-CM

## 2021-01-15 DIAGNOSIS — R71.0 PRECIPITOUS DROP IN HEMATOCRIT: ICD-10-CM

## 2021-02-03 PROCEDURE — 86900 BLOOD TYPING SEROLOGIC ABO: CPT

## 2021-02-03 PROCEDURE — 86850 RBC ANTIBODY SCREEN: CPT

## 2021-02-03 PROCEDURE — 80053 COMPREHEN METABOLIC PANEL: CPT

## 2021-02-03 PROCEDURE — C1766: CPT

## 2021-02-03 PROCEDURE — C1731: CPT

## 2021-02-03 PROCEDURE — C1894: CPT

## 2021-02-03 PROCEDURE — C1759: CPT

## 2021-02-03 PROCEDURE — 83735 ASSAY OF MAGNESIUM: CPT

## 2021-02-03 PROCEDURE — 85610 PROTHROMBIN TIME: CPT

## 2021-02-03 PROCEDURE — 86901 BLOOD TYPING SEROLOGIC RH(D): CPT

## 2021-02-03 PROCEDURE — 86803 HEPATITIS C AB TEST: CPT

## 2021-02-03 PROCEDURE — 80048 BASIC METABOLIC PNL TOTAL CA: CPT

## 2021-02-03 PROCEDURE — 36415 COLL VENOUS BLD VENIPUNCTURE: CPT

## 2021-02-03 PROCEDURE — 85027 COMPLETE CBC AUTOMATED: CPT

## 2021-02-03 PROCEDURE — C2630: CPT

## 2021-02-03 PROCEDURE — 85730 THROMBOPLASTIN TIME PARTIAL: CPT

## 2021-02-03 PROCEDURE — C1730: CPT

## 2021-02-08 PROBLEM — I10 ESSENTIAL (PRIMARY) HYPERTENSION: Chronic | Status: ACTIVE | Noted: 2021-01-08

## 2021-02-08 PROBLEM — I49.3 VENTRICULAR PREMATURE DEPOLARIZATION: Chronic | Status: ACTIVE | Noted: 2021-01-08

## 2021-02-08 PROBLEM — Z86.79 PERSONAL HISTORY OF OTHER DISEASES OF THE CIRCULATORY SYSTEM: Chronic | Status: ACTIVE | Noted: 2021-01-08

## 2021-02-08 PROBLEM — E78.5 HYPERLIPIDEMIA, UNSPECIFIED: Chronic | Status: ACTIVE | Noted: 2021-01-08

## 2021-02-11 RX ORDER — DILTIAZEM HYDROCHLORIDE 120 MG/1
120 CAPSULE, EXTENDED RELEASE ORAL
Qty: 180 | Refills: 3 | Status: DISCONTINUED | COMMUNITY
Start: 2020-02-18 | End: 2021-02-11

## 2021-03-10 ENCOUNTER — APPOINTMENT (OUTPATIENT)
Dept: HEART AND VASCULAR | Facility: CLINIC | Age: 66
End: 2021-03-10

## 2021-03-10 ENCOUNTER — APPOINTMENT (OUTPATIENT)
Dept: HEART AND VASCULAR | Facility: CLINIC | Age: 66
End: 2021-03-10
Payer: MEDICARE

## 2021-03-10 ENCOUNTER — NON-APPOINTMENT (OUTPATIENT)
Age: 66
End: 2021-03-10

## 2021-03-10 VITALS
WEIGHT: 212 LBS | TEMPERATURE: 98.4 F | SYSTOLIC BLOOD PRESSURE: 140 MMHG | HEIGHT: 71 IN | DIASTOLIC BLOOD PRESSURE: 77 MMHG | BODY MASS INDEX: 29.68 KG/M2

## 2021-03-10 PROCEDURE — 99213 OFFICE O/P EST LOW 20 MIN: CPT | Mod: 25

## 2021-03-10 PROCEDURE — 93000 ELECTROCARDIOGRAM COMPLETE: CPT

## 2021-03-10 RX ORDER — PROPAFENONE 325 MG/1
325 CAPSULE, EXTENDED RELEASE ORAL
Qty: 180 | Refills: 3 | Status: DISCONTINUED | COMMUNITY
Start: 2019-12-11 | End: 2021-03-10

## 2021-03-10 NOTE — PHYSICAL EXAM
[General Appearance - Well Developed] : well developed [Normal Appearance] : normal appearance [Well Groomed] : well groomed [General Appearance - Well Nourished] : well nourished [No Deformities] : no deformities [General Appearance - In No Acute Distress] : no acute distress [Normal Conjunctiva] : the conjunctiva exhibited no abnormalities [Normal Oral Mucosa] : normal oral mucosa [Normal Jugular Venous V Waves Present] : normal jugular venous V waves present [] : no respiratory distress [Respiration, Rhythm And Depth] : normal respiratory rhythm and effort [Exaggerated Use Of Accessory Muscles For Inspiration] : no accessory muscle use [Heart Rate And Rhythm] : heart rate and rhythm were normal [Heart Sounds] : normal S1 and S2 [Edema] : no peripheral edema present [Abdomen Soft] : soft [Abnormal Walk] : normal gait [Cyanosis, Localized] : no localized cyanosis [Skin Color & Pigmentation] : normal skin color and pigmentation [Oriented To Time, Place, And Person] : oriented to person, place, and time [Impaired Insight] : insight and judgment were intact [Affect] : the affect was normal [Mood] : the mood was normal [No Anxiety] : not feeling anxious

## 2021-03-12 NOTE — ADDENDUM
[FreeTextEntry1] : I, Fish Castillo, hereby attest that the medical record entry for this patient accurately reflects signatures/notations that I made on the Date of Service in my capacity as an Attending Physician when I treated/diagnosed the above patient. I do hereby attest that this information is true, accurate and complete to the best of my knowledge and I understand that any falsification, omission, or concealment of material fact may subject me to administrative, civil, or, criminal liability. \par I was present for the entire visit and supervised the entire visit and agree with the plan as outlined.\par

## 2021-03-12 NOTE — DISCUSSION/SUMMARY
[FreeTextEntry1] :  65 year old male with HTN, HLD, PVCs and paroxysmal atrial tachycardia s/p pulmonary vein isolation and atrial flutter ablation 1/2021, who presents for follow up evaluation.  He notes a significant improvement in the way he feels post ablation. He is off Multaq.  WE discussed that Eliquis is recommended to be taken twice a day.  His CHADS score is 2 and he should remain on anticoagulation for now.  He will follow up in 6 months or sooner if needed.  He knows to call with any questions or concerns.

## 2021-03-12 NOTE — HISTORY OF PRESENT ILLNESS
[FreeTextEntry1] :  65 year old male with HTN, HLD, PVCs and paroxysmal atrial tachycardia s/p pulmonary vein isolation and atrial flutter ablation 1/2021, who presents for follow up evaluation.\par \par He states he has had episodes of atrial tachycardia for a few years and has been on propafenone.  This first started in his 20s.  HE had more episodes that started occurring daily and ultimately underwent an ablation (PVI and atrial flutter line).   Since his ablation he notes a significant improvement in the way he feels.  HE went from having daily palpitations (usually at night) to no further sustained palpitations (once or twice he has had a skipped beat).  No chest pain, SOB, orthopnea, PND, syncope.  He is on Eliquis that he occasionally only takes once a day.

## 2021-03-12 NOTE — REASON FOR VISIT
[Follow-Up - Clinic] : a clinic follow-up of [Atrial Fibrillation] : atrial fibrillation [FreeTextEntry1] : atrial tachycardia

## 2021-03-29 ENCOUNTER — NON-APPOINTMENT (OUTPATIENT)
Age: 66
End: 2021-03-29

## 2021-03-29 NOTE — HISTORY OF PRESENT ILLNESS
[FreeTextEntry1] : This 65 year-old male patient presents for cardiovascular evaluation.\par \par His problem list is as noted above.\par \par THIS IS A CHART UPDATE PREPARED IN ADVANCE OF A FUTURE APPOINTMENT. THE ENTRY BELOW IS FROM THE PRIOR NOTE.\par \par

## 2021-03-29 NOTE — REASON FOR VISIT
[FreeTextEntry1] : Mr. RONEL OGLESBY has the following problem list:\par \par Hypertension\par Dyslipidemia\par Cardiac arrhythmias/paroxysmal atrial tachycardia/ventricular ectopy\par s/p ablation for atrial flutter Jan 2021\par Minor echo valve/abnormalities\par Bronchospasm\par \par He has additional medical problems as noted.\par \par His primary care physician is Doctor Efren Lockwood.

## 2021-03-29 NOTE — PHYSICAL EXAM

## 2021-06-14 ENCOUNTER — RX RENEWAL (OUTPATIENT)
Age: 66
End: 2021-06-14

## 2021-08-05 ENCOUNTER — NON-APPOINTMENT (OUTPATIENT)
Age: 66
End: 2021-08-05

## 2021-09-09 ENCOUNTER — RX RENEWAL (OUTPATIENT)
Age: 66
End: 2021-09-09

## 2021-10-04 ENCOUNTER — APPOINTMENT (OUTPATIENT)
Dept: HEART AND VASCULAR | Facility: CLINIC | Age: 66
End: 2021-10-04
Payer: MEDICARE

## 2021-10-04 PROCEDURE — 93000 ELECTROCARDIOGRAM COMPLETE: CPT

## 2021-10-04 PROCEDURE — 99213 OFFICE O/P EST LOW 20 MIN: CPT

## 2021-10-04 NOTE — HISTORY OF PRESENT ILLNESS
[FreeTextEntry1] : 66 year old male with HTN, HLD, PVCs and paroxysmal atrial tachycardia s/p pulmonary vein isolation and typical atrial flutter ablation 1/2021, who presents for follow up evaluation.\par \par He states he has had episodes of atrial tachycardia for a few years and has been on propafenone.  This first started in his 20s.  He had more episodes that started occurring daily and ultimately underwent an ablation (PVI and CTI line).   \par \par Since his ablation he notes a marked improvement in the way he feels, has resumed his normal activity with no limitations.. No longer feels sustained daily palpitations. No chest pain, SOB, orthopnea, PND, syncope.  He is on Eliquis that he occasionally only takes once a day. \par Patient underwent lumbar spine surgery recently.

## 2021-10-04 NOTE — DISCUSSION/SUMMARY
[FreeTextEntry1] : 66 year old male with HTN, HLD, PVCs and paroxysmal atrial tachycardia s/p pulmonary vein isolation and atrial flutter ablation 1/2021, who presents for follow up evaluation.\par \par \par He notes a marked improvement in the way he feels post ablation. He is off Multaq and maintained on metoprolol. Patient reports only taking eliquis once a day, risks and benefits of continuing anti coagulation discussed, mutual decision made to discontinue AC given patients low CHADVASC score and compliance. Recommended event monitor for 2-3 weeks for arrhythmia surveillance by Dr. Walls. Patient will continue to follow with Dr. Walls and see me as needed. He knows to call with any questions or concerns. \par

## 2021-10-04 NOTE — PHYSICAL EXAM
[General Appearance - Well Developed] : well developed [Normal Appearance] : normal appearance [Well Groomed] : well groomed [General Appearance - Well Nourished] : well nourished [No Deformities] : no deformities [General Appearance - In No Acute Distress] : no acute distress [Normal Oral Mucosa] : normal oral mucosa [Normal Jugular Venous V Waves Present] : normal jugular venous V waves present [] : no respiratory distress [Respiration, Rhythm And Depth] : normal respiratory rhythm and effort [Exaggerated Use Of Accessory Muscles For Inspiration] : no accessory muscle use [Heart Rate And Rhythm] : heart rate and rhythm were normal [Edema] : no peripheral edema present [Heart Sounds] : normal S1 and S2 [Abdomen Soft] : soft [Abnormal Walk] : normal gait [Cyanosis, Localized] : no localized cyanosis [Skin Color & Pigmentation] : normal skin color and pigmentation [Oriented To Time, Place, And Person] : oriented to person, place, and time [Impaired Insight] : insight and judgment were intact [Affect] : the affect was normal [Mood] : the mood was normal [No Anxiety] : not feeling anxious [Well Developed] : well developed [Well Nourished] : well nourished [No Acute Distress] : no acute distress [Normal Conjunctiva] : normal conjunctiva [Normal Venous Pressure] : normal venous pressure [No Carotid Bruit] : no carotid bruit [Normal S1, S2] : normal S1, S2 [No Murmur] : no murmur [No Rub] : no rub [No Gallop] : no gallop [Clear Lung Fields] : clear lung fields [Good Air Entry] : good air entry [No Respiratory Distress] : no respiratory distress  [Soft] : abdomen soft [Non Tender] : non-tender [No Masses/organomegaly] : no masses/organomegaly [Normal Bowel Sounds] : normal bowel sounds [Normal Gait] : normal gait [No Edema] : no edema [No Cyanosis] : no cyanosis [No Clubbing] : no clubbing [No Varicosities] : no varicosities [No Rash] : no rash [No Skin Lesions] : no skin lesions [Moves all extremities] : moves all extremities [No Focal Deficits] : no focal deficits [Normal Speech] : normal speech [Alert and Oriented] : alert and oriented [Normal memory] : normal memory

## 2021-10-19 ENCOUNTER — RX RENEWAL (OUTPATIENT)
Age: 66
End: 2021-10-19

## 2021-11-08 ENCOUNTER — NON-APPOINTMENT (OUTPATIENT)
Age: 66
End: 2021-11-08

## 2021-11-09 ENCOUNTER — APPOINTMENT (OUTPATIENT)
Dept: CARDIOLOGY | Facility: CLINIC | Age: 66
End: 2021-11-09
Payer: MEDICARE

## 2021-11-09 ENCOUNTER — NON-APPOINTMENT (OUTPATIENT)
Age: 66
End: 2021-11-09

## 2021-11-09 VITALS
DIASTOLIC BLOOD PRESSURE: 70 MMHG | SYSTOLIC BLOOD PRESSURE: 135 MMHG | WEIGHT: 218 LBS | OXYGEN SATURATION: 97 % | HEART RATE: 64 BPM | BODY MASS INDEX: 30.52 KG/M2 | HEIGHT: 71 IN

## 2021-11-09 DIAGNOSIS — Z87.448 PERSONAL HISTORY OF OTHER DISEASES OF URINARY SYSTEM: ICD-10-CM

## 2021-11-09 PROCEDURE — 93000 ELECTROCARDIOGRAM COMPLETE: CPT

## 2021-11-09 PROCEDURE — 99214 OFFICE O/P EST MOD 30 MIN: CPT

## 2021-11-09 RX ORDER — APIXABAN 5 MG/1
5 TABLET, FILM COATED ORAL
Qty: 60 | Refills: 6 | Status: DISCONTINUED | COMMUNITY
Start: 2021-02-08 | End: 2021-11-09

## 2021-11-09 NOTE — HISTORY OF PRESENT ILLNESS
[FreeTextEntry1] : This 65 year-old male patient presents for cardiovascular evaluation.\par \par His problem list is as noted above.\par \par His last full examination 1 year ago the patient has had some significant medical interactions.\par \par The patient initially underwent repeat spine surgery, discectomy.  He was initially successful but then has had some recurrence of symptoms.  He will be having reevaluation with a pain specialist.\par \par He subsequently had electrophysiologic evaluation and underwent successful ablation of atrial tachycardia and flutter, pulmonary vein isolation with Dr. Alves.  This was very successful.  He has had evaluation approximately 1 month ago.\par \par His previous Eliquis is now discontinued.\par \par The patient remains very active using his elliptical machine however he has significant discomfort.  No shortness of breath, chest discomfort, palpitation, lightheadedness, fainting.\par

## 2021-11-09 NOTE — ASSESSMENT
[FreeTextEntry1] : EKG 11/9/2021.  Sinus rhythm, within normal limits.\par EKG 11/30/2020.  Sinus rhythm.  T wave inversion V1 through V3, within normal limits without significant change.\par EKG 7/14/2020.  Sinus rhythm, within normal limits.\par EKG 10/21/2019.  Sinus bradycardia rate minor interventricular conduction delay.  No significant change.\par EKG 2/9/19. Sinus rhythm. Minor interventricular conduction delay. No acute changes.

## 2021-11-09 NOTE — REVIEW OF SYSTEMS
[Negative] : Heme/Lymph [FreeTextEntry3] : Transplant herpetic infection.  Periodic left episcleritis, essentially resolved. [FreeTextEntry5] : See HPI. [FreeTextEntry6] : Resolved GERD related cough.  Occasional snoring but no known sleep apnea. [FreeTextEntry7] : Treated GERD.  Improved constipation.  Improved hemorrhoids. [FreeTextEntry8] : 1 time nocturia.  Improved.  No ED. [FreeTextEntry9] : Left shoulder surgery.  Spinal stenosis status post surgery 2016 with reoperation..  Left bunionectomy.  Status post cervical neck fusion.  Left total knee replacement.  Current right thigh symptoms associated with lumbar disc disease. [de-identified] : Status post multiple spine surgeries.

## 2021-11-09 NOTE — CARDIOLOGY SUMMARY
[de-identified] :  2-week event monitor, Zio patch ending 11/18/2020. Sinus rhythm. Overall occasional    APCs. Rare VPC. Multiple short supraventricular runs, longest 17 beats. Fastest 11  beats at a rate of 190 bpm. [de-identified] : tress test 1/31/17. No ischemia or arrhythmias. 10 minutes. Boogie stage IV. 10.6    METs. 76%. Blunted by beta blockade.\par S/E 03/2003 normal. 12 minutes. Boogie stage IV. \par  [de-identified] :  Echo 10/15/2019. Minimally dilated LV X 0.3/3.8 cm with normal systolic function, wall   motion and wall thickness. Ejection fraction 70%. Normal valve morphology. Trace mitral,  aortic, and tricuspid insufficiency, insignificant. Normal PA pressure\par     22. No change from 2017 echo performed at another facility.\par Echo [facility]. 1/27/17. Normal LV function and wall thickness. EF 60%. Mild diastolic\par     dysfunction. Normal valve morphology. Trivial AI. \par  [de-identified] : Cardiac arrhythmias/paroxysmal atrial tachycardia/flutter/fibrillation/ventricular ectopy.  Status post ablation pulmonary vein isolation, cavotricuspid isthmus ablation.

## 2021-11-09 NOTE — DISCUSSION/SUMMARY
[FreeTextEntry1] : Brief recommendations and follow-up: (see above for details)\par \par Patient is doing well after ablation procedures.  No further significant arrhythmias.\par Continue current routine for his lipids, blood pressure.\par He will be seeking consultation for his chronic back and orthopedic syndromes.\par Next full cardiology visit 6 months.

## 2021-11-09 NOTE — REASON FOR VISIT
[FreeTextEntry1] : Mr. RONEL OGLESBY has the following problem list:\par \par Hypertension\par Dyslipidemia\par Cardiac arrhythmias/paroxysmal atrial tachycardia/flutter/fibrillation/ventricular ectopy.  Status post ablation pulmonary vein isolation, cavotricuspid isthmus ablation.\par Minor echo valve/abnormalities\par Bronchospasm\par \par He has additional medical problems as noted.\par \par His primary care physician is Doctor Efren Lockwood.

## 2022-05-09 ENCOUNTER — RX RENEWAL (OUTPATIENT)
Age: 67
End: 2022-05-09

## 2022-06-14 ENCOUNTER — NON-APPOINTMENT (OUTPATIENT)
Age: 67
End: 2022-06-14

## 2022-06-15 ENCOUNTER — NON-APPOINTMENT (OUTPATIENT)
Age: 67
End: 2022-06-15

## 2022-06-15 ENCOUNTER — APPOINTMENT (OUTPATIENT)
Dept: CARDIOLOGY | Facility: CLINIC | Age: 67
End: 2022-06-15
Payer: MEDICARE

## 2022-06-15 VITALS
BODY MASS INDEX: 30.24 KG/M2 | SYSTOLIC BLOOD PRESSURE: 138 MMHG | OXYGEN SATURATION: 95 % | WEIGHT: 216 LBS | HEIGHT: 71 IN | DIASTOLIC BLOOD PRESSURE: 80 MMHG | HEART RATE: 74 BPM | TEMPERATURE: 98 F

## 2022-06-15 DIAGNOSIS — U07.1 COVID-19: ICD-10-CM

## 2022-06-15 DIAGNOSIS — I49.3 VENTRICULAR PREMATURE DEPOLARIZATION: ICD-10-CM

## 2022-06-15 DIAGNOSIS — I49.9 CARDIAC ARRHYTHMIA, UNSPECIFIED: ICD-10-CM

## 2022-06-15 DIAGNOSIS — E78.5 HYPERLIPIDEMIA, UNSPECIFIED: ICD-10-CM

## 2022-06-15 DIAGNOSIS — J98.01 ACUTE BRONCHOSPASM: ICD-10-CM

## 2022-06-15 DIAGNOSIS — I10 ESSENTIAL (PRIMARY) HYPERTENSION: ICD-10-CM

## 2022-06-15 PROCEDURE — 93000 ELECTROCARDIOGRAM COMPLETE: CPT

## 2022-06-15 PROCEDURE — 99214 OFFICE O/P EST MOD 30 MIN: CPT

## 2022-06-15 NOTE — CARDIOLOGY SUMMARY
[de-identified] :  2-week event monitor, Zio patch ending 11/18/2020. Sinus rhythm. Overall occasional    APCs. Rare VPC. Multiple short supraventricular runs, longest 17 beats. Fastest 11  beats at a rate of 190 bpm. [de-identified] : Stress test 1/31/17. No ischemia or arrhythmias. 10 minutes. Boogie stage IV. 10.6    METs. 76%. Blunted by beta blockade.\par S/E 03/2003 normal. 12 minutes. Boogie stage IV. \par  [de-identified] : Echo 10/15/2019. Minimally dilated LV X 0.3/3.8 cm with normal systolic function, wall   motion and wall thickness. Ejection fraction 70%. Normal valve morphology. Trace mitral,  aortic, and tricuspid insufficiency, insignificant. Normal PA pressure  22. No change from 2017 echo performed at another facility.\par \par Echo [facility]. 1/27/17. Normal LV function and wall thickness. EF 60%. Mild diastolic  dysfunction. Normal valve morphology. Trivial AI. \par  [de-identified] : Cardiac arrhythmias/paroxysmal atrial tachycardia/flutter/fibrillation/ventricular ectopy.  Status post ablation pulmonary vein isolation, cavotricuspid isthmus ablation.

## 2022-06-15 NOTE — DISCUSSION/SUMMARY
[FreeTextEntry1] : Brief recommendations and follow-up: (see above for details)\par \par The patient's overall cardiovascular status is well compensated.\par I did encourage him as best he can within his functional limitations regarding exercise and diet.\par Blood pressure under good control.\par As noted, aspirin to be discontinued today.\par I advised cutting back on his nonsteroidal anti-inflammatory agents.  I do note he has chronic pain.\par He will provide copies of his full laboratories for me.\par I will schedule an echocardiogram over the summer.\par Next routine visit 1 year.

## 2022-06-15 NOTE — HISTORY OF PRESENT ILLNESS
[FreeTextEntry1] : This 66 year-old male patient presents for cardiovascular evaluation.\par \par His problem list is as noted above.\par \par Since his last examination 6 months ago he reports some medical interactions.  He did have a mild case of COVID-19.\par \par He continues with chronic multijoint arthritic symptoms.  He is trying to stay active.  He purchased a exercise bicycle for his home.\par \par No symptoms of chest discomfort, shortness of breath, palpitation, lightheadedness, fainting.\par \par He reports he has had laboratories and his primary care physician wishes to increase his statin dose.  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

## 2022-06-15 NOTE — ASSESSMENT
[FreeTextEntry1] : EKG 6/15/2022.  Sinus rhythm, within normal limits.  Nonspecific T wave inversion V1 V2, biphasic V3.\par EKG 11/9/2021.  Sinus rhythm, within normal limits.\par EKG 11/30/2020.  Sinus rhythm.  T wave inversion V1 through V3, within normal limits without significant change.\par EKG 7/14/2020.  Sinus rhythm, within normal limits.\par EKG 10/21/2019.  Sinus bradycardia rate minor interventricular conduction delay.  No significant change.\par EKG 2/9/19. Sinus rhythm. Minor interventricular conduction delay. No acute changes.

## 2022-06-15 NOTE — REVIEW OF SYSTEMS
[FreeTextEntry3] : Transplant herpetic infection.  Periodic left episcleritis, essentially resolved. [FreeTextEntry5] : See HPI. [FreeTextEntry6] : Resolved GERD related cough.  Occasional snoring but no known sleep apnea. [FreeTextEntry7] : Treated GERD.  Improved constipation.  Improved hemorrhoids. [FreeTextEntry8] : 1 time nocturia.  Improved.  No ED. [FreeTextEntry9] : Left shoulder surgery.  Spinal stenosis status post surgery 2016 with reoperation..  Left bunionectomy.  Status post cervical neck fusion.  Left total knee replacement.  right thigh symptoms associated with lumbar disc disease. [de-identified] : Status post multiple spine surgeries.

## 2022-07-20 ENCOUNTER — APPOINTMENT (OUTPATIENT)
Dept: CARDIOLOGY | Facility: CLINIC | Age: 67
End: 2022-07-20

## 2022-07-20 PROCEDURE — 93306 TTE W/DOPPLER COMPLETE: CPT

## 2022-07-25 DIAGNOSIS — I38 ENDOCARDITIS, VALVE UNSPECIFIED: ICD-10-CM

## 2022-12-20 ENCOUNTER — RX RENEWAL (OUTPATIENT)
Age: 67
End: 2022-12-20

## 2022-12-28 ENCOUNTER — RX RENEWAL (OUTPATIENT)
Age: 67
End: 2022-12-28

## 2023-09-27 ENCOUNTER — APPOINTMENT (OUTPATIENT)
Dept: CARDIOLOGY | Facility: CLINIC | Age: 68
End: 2023-09-27

## 2023-10-04 ENCOUNTER — RX RENEWAL (OUTPATIENT)
Age: 68
End: 2023-10-04

## 2023-11-14 ENCOUNTER — RX RENEWAL (OUTPATIENT)
Age: 68
End: 2023-11-14

## 2023-11-29 ENCOUNTER — RX RENEWAL (OUTPATIENT)
Age: 68
End: 2023-11-29

## 2024-02-07 ENCOUNTER — NON-APPOINTMENT (OUTPATIENT)
Age: 69
End: 2024-02-07

## 2024-02-07 DIAGNOSIS — Z92.89 PERSONAL HISTORY OF OTHER MEDICAL TREATMENT: ICD-10-CM

## 2024-02-07 DIAGNOSIS — Z01.89 ENCOUNTER FOR OTHER SPECIFIED SPECIAL EXAMINATIONS: ICD-10-CM

## 2024-02-07 NOTE — REVIEW OF SYSTEMS
[Negative] : Heme/Lymph [FreeTextEntry3] : Transplant herpetic infection.  Periodic left episcleritis, essentially resolved. [FreeTextEntry5] : See HPI. [FreeTextEntry6] : Resolved GERD related cough.  Occasional snoring but no known sleep apnea. [FreeTextEntry8] : 1 time nocturia.  Improved.  No ED. [FreeTextEntry7] : Treated GERD.  Improved constipation.  Improved hemorrhoids. [FreeTextEntry9] : Left shoulder surgery.  Spinal stenosis status post surgery 2016 with reoperation..  Left bunionectomy.  Status post cervical neck fusion.  Left total knee replacement.  right thigh symptoms associated with lumbar disc disease. [de-identified] : Status post multiple spine surgeries.

## 2024-02-07 NOTE — HISTORY OF PRESENT ILLNESS
[FreeTextEntry1] : This 68 year-old male patient presents for cardiovascular evaluation.  His problem list is as noted above.  THIS IS A CHART UPDATE PREPARED IN ADVANCE OF A FUTURE APPOINTMENT. THE ENTRY BELOW IS FROM THE PRIOR NOTE.

## 2024-02-07 NOTE — CARDIOLOGY SUMMARY
[de-identified] :  2-week event monitor, Zio patch ending 11/18/2020. Sinus rhythm. Overall occasional    APCs. Rare VPC. Multiple short supraventricular runs, longest 17 beats. Fastest 11  beats at a rate of 190 bpm. [de-identified] : Echo 7/20/2022.  Normal LV function.  EF 60%.  Minimally increased LA size, 4.1 cm.  Normal valve morphology.  Trivial MR.  Trivial TR.  Trivial AI.  Normal PA 16.  Trivially dilated aortic root, 3.6 cm and proximal ascending aorta, 3.7 cm.  No significant change from 2019.  Echo 10/15/2019. Minimally dilated LV X 0.3/3.8 cm with normal systolic function, wall   motion and wall thickness. Ejection fraction 70%. Normal valve morphology. Trace mitral,  aortic, and tricuspid insufficiency, insignificant. Normal PA pressure  22. No change from 2017 echo performed at another facility.  Echo [facility]. 1/27/17. Normal LV function and wall thickness. EF 60%. Mild diastolic  dysfunction. Normal valve morphology. Trivial AI.   [de-identified] : Stress test 1/31/17. No ischemia or arrhythmias. 10 minutes. Boogie stage IV. 10.6    METs. 76%. Blunted by beta blockade.\par  S/E 03/2003 normal. 12 minutes. Boogie stage IV. \par   [de-identified] : Cardiac arrhythmias/paroxysmal atrial tachycardia/flutter/fibrillation/ventricular ectopy.  Status post ablation pulmonary vein isolation, cavotricuspid isthmus ablation.

## 2024-02-07 NOTE — PHYSICAL EXAM
[Well Developed] : well developed [No Acute Distress] : no acute distress [Well Nourished] : well nourished [Normal Conjunctiva] : normal conjunctiva [Normal Venous Pressure] : normal venous pressure [No Carotid Bruit] : no carotid bruit [Normal S1, S2] : normal S1, S2 [No Murmur] : no murmur [No Rub] : no rub [No Gallop] : no gallop [Clear Lung Fields] : clear lung fields [Good Air Entry] : good air entry [No Respiratory Distress] : no respiratory distress  [Soft] : abdomen soft [Non Tender] : non-tender [Normal Bowel Sounds] : normal bowel sounds [No Masses/organomegaly] : no masses/organomegaly [Normal Gait] : normal gait [No Edema] : no edema [No Cyanosis] : no cyanosis [No Clubbing] : no clubbing [No Varicosities] : no varicosities [No Rash] : no rash [No Skin Lesions] : no skin lesions [Moves all extremities] : moves all extremities [No Focal Deficits] : no focal deficits [Normal Speech] : normal speech [Alert and Oriented] : alert and oriented [Normal memory] : normal memory [de-identified] : Overweight.

## 2024-02-27 ENCOUNTER — APPOINTMENT (OUTPATIENT)
Dept: CARDIOLOGY | Facility: CLINIC | Age: 69
End: 2024-02-27

## 2024-03-21 ENCOUNTER — NON-APPOINTMENT (OUTPATIENT)
Age: 69
End: 2024-03-21

## 2024-03-22 ENCOUNTER — APPOINTMENT (OUTPATIENT)
Dept: HEART AND VASCULAR | Facility: CLINIC | Age: 69
End: 2024-03-22
Payer: MEDICARE

## 2024-03-22 ENCOUNTER — NON-APPOINTMENT (OUTPATIENT)
Age: 69
End: 2024-03-22

## 2024-03-22 VITALS
HEART RATE: 63 BPM | WEIGHT: 216 LBS | BODY MASS INDEX: 30.92 KG/M2 | SYSTOLIC BLOOD PRESSURE: 130 MMHG | OXYGEN SATURATION: 96 % | DIASTOLIC BLOOD PRESSURE: 78 MMHG | HEIGHT: 70 IN

## 2024-03-22 DIAGNOSIS — I48.91 UNSPECIFIED ATRIAL FIBRILLATION: ICD-10-CM

## 2024-03-22 DIAGNOSIS — R94.31 ABNORMAL ELECTROCARDIOGRAM [ECG] [EKG]: ICD-10-CM

## 2024-03-22 PROCEDURE — 99214 OFFICE O/P EST MOD 30 MIN: CPT

## 2024-03-22 PROCEDURE — 93242 EXT ECG>48HR<7D RECORDING: CPT

## 2024-03-22 PROCEDURE — 93000 ELECTROCARDIOGRAM COMPLETE: CPT | Mod: 59

## 2024-03-22 RX ORDER — GABAPENTIN 300 MG
300 TABLET ORAL
Refills: 0 | Status: ACTIVE | COMMUNITY

## 2024-03-22 NOTE — DISCUSSION/SUMMARY
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 He is active. No CP/SOB/SUTTON/palpitations/syncope. EKG: V1-V4 TWI and lateral Q waves. LDL: 80s   Will do a SPECT with the family Hx and abn EKG  Calcium score  Holter for a week for Fib/flutter and F/U with EP    [EKG obtained to assist in diagnosis and management of assessed problem(s)] : EKG obtained to assist in diagnosis and management of assessed problem(s)

## 2024-03-22 NOTE — PHYSICAL EXAM
[Well Developed] : well developed [Well Nourished] : well nourished [No Acute Distress] : no acute distress [Normal Conjunctiva] : normal conjunctiva [Normal Venous Pressure] : normal venous pressure [No Carotid Bruit] : no carotid bruit [Normal S1, S2] : normal S1, S2 [No Rub] : no rub [No Murmur] : no murmur [No Gallop] : no gallop [Clear Lung Fields] : clear lung fields [Good Air Entry] : good air entry [Soft] : abdomen soft [No Respiratory Distress] : no respiratory distress  [Non Tender] : non-tender [No Masses/organomegaly] : no masses/organomegaly [Normal Bowel Sounds] : normal bowel sounds [No Edema] : no edema [Normal Gait] : normal gait [No Clubbing] : no clubbing [No Cyanosis] : no cyanosis [No Rash] : no rash [No Varicosities] : no varicosities [Moves all extremities] : moves all extremities [No Skin Lesions] : no skin lesions [No Focal Deficits] : no focal deficits [Normal Speech] : normal speech [Alert and Oriented] : alert and oriented [Normal memory] : normal memory

## 2024-03-22 NOTE — CARDIOLOGY SUMMARY
[de-identified] : Remote/Ambulatory Rhythm Monitorin-week event monitor, Zio patch ending 2020. Sinus rhythm. Overall occasional APCs. Rare VPC. Multiple short supraventricular runs, longest 17 beats. Fastest 11 beats at a rate of 190 bpm.   Stress Test: Stress test 17. No ischemia or arrhythmias. 10 minutes. Boogie stage IV. 10.6 METs. 76%. Blunted by beta blockade. S/E 2003 normal. 12 minutes. Boogie stage IV.   Echo: Echo 2022. Normal LV function. EF 60%. Minimally increased LA size, 4.1 cm. Normal valve morphology. Trivial MR. Trivial TR. Trivial AI. Normal PA 16. Trivially dilated aortic root, 3.6 cm and proximal ascending aorta, 3.7 cm. No significant change from 2019.  Echo 10/15/2019. Minimally dilated LV X 0.3/3.8 cm with normal systolic function, wall motion and wall thickness. Ejection fraction 70%. Normal valve morphology. Trace mitral, aortic, and tricuspid insufficiency, insignificant. Normal PA pressure 22. No change from 2017 echo performed at another facility.  Echo [facility]. 17. Normal LV function and wall thickness. EF 60%. Mild diastolic dysfunction. Normal valve morphology. Trivial AI.   EP: Cardiac arrhythmias/paroxysmal atrial tachycardia/flutter/fibrillation/ventricular ectopy. Status post ablation pulmonary vein isolation, cavotricuspid isthmus ablation.

## 2024-04-15 ENCOUNTER — RX RENEWAL (OUTPATIENT)
Age: 69
End: 2024-04-15

## 2024-04-15 RX ORDER — OLMESARTAN MEDOXOMIL 40 MG/1
40 TABLET, FILM COATED ORAL DAILY
Qty: 90 | Refills: 3 | Status: ACTIVE | COMMUNITY
Start: 2019-06-18 | End: 1900-01-01

## 2024-04-15 RX ORDER — ATORVASTATIN CALCIUM 40 MG/1
40 TABLET, FILM COATED ORAL DAILY
Qty: 90 | Refills: 3 | Status: ACTIVE | COMMUNITY
Start: 2019-04-26 | End: 1900-01-01

## 2024-04-15 RX ORDER — CHLORTHALIDONE 25 MG/1
25 TABLET ORAL DAILY
Qty: 90 | Refills: 3 | Status: ACTIVE | COMMUNITY
Start: 2019-05-09 | End: 1900-01-01

## 2024-04-19 PROCEDURE — 93244 EXT ECG>48HR<7D REV&INTERPJ: CPT

## 2024-04-25 RX ORDER — METOPROLOL SUCCINATE 100 MG/1
100 TABLET, EXTENDED RELEASE ORAL
Qty: 90 | Refills: 0 | Status: ACTIVE | COMMUNITY
Start: 2021-02-11 | End: 1900-01-01

## 2024-07-15 ENCOUNTER — RX RENEWAL (OUTPATIENT)
Age: 69
End: 2024-07-15

## 2025-04-07 ENCOUNTER — RX RENEWAL (OUTPATIENT)
Age: 70
End: 2025-04-07